# Patient Record
Sex: MALE | Race: WHITE | NOT HISPANIC OR LATINO | URBAN - METROPOLITAN AREA
[De-identification: names, ages, dates, MRNs, and addresses within clinical notes are randomized per-mention and may not be internally consistent; named-entity substitution may affect disease eponyms.]

---

## 2021-01-10 ENCOUNTER — NURSE TRIAGE (OUTPATIENT)
Dept: OTHER | Facility: OTHER | Age: 46
End: 2021-01-10

## 2021-01-10 DIAGNOSIS — Z20.828 SARS-ASSOCIATED CORONAVIRUS EXPOSURE: Primary | ICD-10-CM

## 2021-01-10 NOTE — TELEPHONE ENCOUNTER
Reason for Disposition   [1] CLOSE CONTACT COVID-19 EXPOSURE within last 14 days AND [2] NO symptoms    Answer Assessment - Initial Assessment Questions  1  Were you within 6 feet or less, for up to 15 minutes or more with a person that has a confirmed COVID-19 test?     Yes      2  What was the date of your exposure? Jan 1st     3  Are you experiencing any symptoms attributed to the virus?  (Assess for SOB, cough, fever, difficulty breathing)     No symptoms at this time  4  HIGH RISK: Do you have any history heart or lung conditions, weakened immune system, diabetes, Asthma, CHF, HIV, COPD, Chemo, renal failure, sickle cell, etc?     Denies      Protocols used: CORONAVIRUS (COVID-19) EXPOSURE-ADULT-[facility]

## 2021-01-10 NOTE — TELEPHONE ENCOUNTER
Regarding: COVID TEST REQUEST -Asymptomatic   ----- Message from Freedom Houser sent at 1/10/2021  4:15 PM EST -----  "I came into contact with a COVID positive person     Asymptomatic "

## 2021-01-11 DIAGNOSIS — Z20.828 SARS-ASSOCIATED CORONAVIRUS EXPOSURE: ICD-10-CM

## 2021-01-11 PROCEDURE — U0003 INFECTIOUS AGENT DETECTION BY NUCLEIC ACID (DNA OR RNA); SEVERE ACUTE RESPIRATORY SYNDROME CORONAVIRUS 2 (SARS-COV-2) (CORONAVIRUS DISEASE [COVID-19]), AMPLIFIED PROBE TECHNIQUE, MAKING USE OF HIGH THROUGHPUT TECHNOLOGIES AS DESCRIBED BY CMS-2020-01-R: HCPCS | Performed by: FAMILY MEDICINE

## 2021-01-11 PROCEDURE — U0005 INFEC AGEN DETEC AMPLI PROBE: HCPCS | Performed by: FAMILY MEDICINE

## 2021-01-12 LAB — SARS-COV-2 RNA SPEC QL NAA+PROBE: NOT DETECTED

## 2021-04-05 ENCOUNTER — NURSE TRIAGE (OUTPATIENT)
Dept: OTHER | Facility: OTHER | Age: 46
End: 2021-04-05

## 2021-04-05 DIAGNOSIS — Z20.828 SARS-ASSOCIATED CORONAVIRUS EXPOSURE: Primary | ICD-10-CM

## 2021-04-05 NOTE — TELEPHONE ENCOUNTER
Reason for Disposition   [1] COVID-19 EXPOSURE (Close Contact) AND [2] within last 14 days BUT [3] NO symptoms    Answer Assessment - Initial Assessment Questions  Were you within 6 feet or less, for up to 15 minutes or more with a person that has a confirmed COVID-19 test?        yes         What was the date of your exposure?         4/3/21         Are you experiencing any symptoms attributed to the virus?  (Assess for SOB, cough, fever, difficulty breathing)      denies         HIGH RISK: Do you have any history heart or lung conditions, weakened immune system, diabetes, Asthma, CHF, HIV, COPD, Chemo, renal failure, sickle cell, etc?        Denies    Protocols used: CORONAVIRUS (COVID-19) EXPOSURE-ADULT-[place]

## 2021-04-05 NOTE — TELEPHONE ENCOUNTER
Regarding: covid exposure krunal ( 1 of 4)   ----- Message from Rahel Arreola sent at 4/5/2021  2:06 PM EDT -----  " I was exposed to someone positive, no symptoms  "

## 2021-04-07 DIAGNOSIS — Z20.828 SARS-ASSOCIATED CORONAVIRUS EXPOSURE: ICD-10-CM

## 2021-04-07 PROCEDURE — U0003 INFECTIOUS AGENT DETECTION BY NUCLEIC ACID (DNA OR RNA); SEVERE ACUTE RESPIRATORY SYNDROME CORONAVIRUS 2 (SARS-COV-2) (CORONAVIRUS DISEASE [COVID-19]), AMPLIFIED PROBE TECHNIQUE, MAKING USE OF HIGH THROUGHPUT TECHNOLOGIES AS DESCRIBED BY CMS-2020-01-R: HCPCS | Performed by: FAMILY MEDICINE

## 2021-04-07 PROCEDURE — U0005 INFEC AGEN DETEC AMPLI PROBE: HCPCS | Performed by: FAMILY MEDICINE

## 2021-04-08 LAB — SARS-COV-2 RNA RESP QL NAA+PROBE: NEGATIVE
